# Patient Record
Sex: FEMALE | Race: WHITE | NOT HISPANIC OR LATINO
[De-identification: names, ages, dates, MRNs, and addresses within clinical notes are randomized per-mention and may not be internally consistent; named-entity substitution may affect disease eponyms.]

---

## 2023-08-15 ENCOUNTER — NON-APPOINTMENT (OUTPATIENT)
Age: 58
End: 2023-08-15

## 2023-08-20 PROBLEM — Z00.00 ENCOUNTER FOR PREVENTIVE HEALTH EXAMINATION: Status: ACTIVE | Noted: 2023-08-20

## 2023-08-22 DIAGNOSIS — Z78.9 OTHER SPECIFIED HEALTH STATUS: ICD-10-CM

## 2023-08-22 DIAGNOSIS — N60.12 DIFFUSE CYSTIC MASTOPATHY OF LEFT BREAST: ICD-10-CM

## 2023-08-22 DIAGNOSIS — Z98.890 OTHER SPECIFIED POSTPROCEDURAL STATES: ICD-10-CM

## 2023-08-22 DIAGNOSIS — Z80.3 FAMILY HISTORY OF MALIGNANT NEOPLASM OF BREAST: ICD-10-CM

## 2023-08-22 DIAGNOSIS — N60.11 DIFFUSE CYSTIC MASTOPATHY OF LEFT BREAST: ICD-10-CM

## 2023-08-24 ENCOUNTER — APPOINTMENT (OUTPATIENT)
Dept: BREAST CENTER | Facility: CLINIC | Age: 58
End: 2023-08-24
Payer: COMMERCIAL

## 2023-08-24 VITALS — WEIGHT: 155 LBS | HEIGHT: 64 IN | BODY MASS INDEX: 26.46 KG/M2

## 2023-08-24 DIAGNOSIS — Z12.39 ENCOUNTER FOR OTHER SCREENING FOR MALIGNANT NEOPLASM OF BREAST: ICD-10-CM

## 2023-08-24 DIAGNOSIS — R92.8 OTHER ABNORMAL AND INCONCLUSIVE FINDINGS ON DIAGNOSTIC IMAGING OF BREAST: ICD-10-CM

## 2023-08-24 DIAGNOSIS — Z80.3 FAMILY HISTORY OF MALIGNANT NEOPLASM OF BREAST: ICD-10-CM

## 2023-08-24 DIAGNOSIS — N60.12 DIFFUSE CYSTIC MASTOPATHY OF LEFT BREAST: ICD-10-CM

## 2023-08-24 DIAGNOSIS — N63.20 UNSPECIFIED LUMP IN THE RIGHT BREAST, UNSPECIFIED QUADRANT: ICD-10-CM

## 2023-08-24 DIAGNOSIS — N63.10 UNSPECIFIED LUMP IN THE RIGHT BREAST, UNSPECIFIED QUADRANT: ICD-10-CM

## 2023-08-24 DIAGNOSIS — Z78.9 OTHER SPECIFIED HEALTH STATUS: ICD-10-CM

## 2023-08-24 DIAGNOSIS — Z86.39 PERSONAL HISTORY OF OTHER ENDOCRINE, NUTRITIONAL AND METABOLIC DISEASE: ICD-10-CM

## 2023-08-24 DIAGNOSIS — N60.11 DIFFUSE CYSTIC MASTOPATHY OF LEFT BREAST: ICD-10-CM

## 2023-08-24 PROCEDURE — 99203 OFFICE O/P NEW LOW 30 MIN: CPT

## 2023-08-24 NOTE — PHYSICAL EXAM
[Normocephalic] : normocephalic [Atraumatic] : atraumatic [EOMI] : extra ocular movement intact [Supple] : supple [No Supraclavicular Adenopathy] : no supraclavicular adenopathy [No Cervical Adenopathy] : no cervical adenopathy [Examined in the supine and seated position] : examined in the supine and seated position [No dominant masses] : no dominant masses in right breast  [No dominant masses] : no dominant masses left breast [No Nipple Retraction] : no left nipple retraction [No Nipple Discharge] : no left nipple discharge [Breast Mass Right Breast ___cm] : no masses [Breast Mass Left Breast ___cm] : no masses [No Axillary Lymphadenopathy] : no left axillary lymphadenopathy [No Edema] : no edema [No Rashes] : no rashes [No Ulceration] : no ulceration [Breast Nipple Inversion] : nipples not inverted [Breast Nipple Retraction] : nipples not retracted [Breast Nipple Flattening] : nipples not flattened [Breast Nipple Fissures] : nipples not fissured [Breast Abnormal Lactation (Galactorrhea)] : no galactorrhea [Breast Abnormal Secretion Bloody Fluid] : no bloody discharge [Breast Abnormal Secretion Serous Fluid] : no serous discharge [Breast Abnormal Secretion Opalescent Fluid] : no milky discharge [de-identified] : On exam, the patient has B-cup breasts.  On palpation, I cannot feel any suspicious densities in either breast.  She has no axillary, supraclavicular, or cervical adenopathy.

## 2023-08-24 NOTE — PAST MEDICAL HISTORY
[Postmenopausal] : The patient is postmenopausal [Menopause Age____] : age at menopause was [unfilled] [History of Hormone Replacement Treatment] : has a history of hormone replacement treatment [Total Preg ___] : G[unfilled] [Live Births ___] : P[unfilled]  [Age At Live Birth ___] : Age at live birth: [unfilled]

## 2023-08-24 NOTE — REASON FOR VISIT
[Initial Eval - Existing Diagnosis] : an initial evaluation of an existing diagnosis [FreeTextEntry1] : The patient is a postmenopausal white female of [place] descent with a strong family history of breast cancer.  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  She had some benign calcifications in her right breast and was seen back in 2019 but was lost to follow-up and comes in now to reestablish care.

## 2023-08-24 NOTE — ASSESSMENT
[FreeTextEntry1] : The patient is a 58-year-old G4, P3 postmenopausal white female of [place] descent.  She underwent menopause at age 49 and took hormone replacement therapy for at least 5 years.  She has a strong family history of breast cancer with 2 sisters who had breast cancer at ages 54 and 55 1 who developed metastatic breast cancer.  She has a sister who passed away from sarcoma at age 48 and a paternal cousin had sarcoma in his mid 50s.  The patient underwent a left breast ultrasound guided core biopsy back in August 2012 showing fibroadenomatoid changes.  She was then seen by me in August 2019 with some coarse calcifications in her right breast upper outer quadrant which were followed and unchanged.  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  She had a Dunia 5-year risk of 9.3% and lifetime risk of 52.4% and qualified for screening MRI but was lost to follow-up in 2019.   She underwent her last bilateral mammography and ultrasound which was reviewed from July 28, 2023 performed at [facility] which showed heterogeneous dense changes in bilateral breast masses which are generally unchanged and stable in the left breast 10:00 region and right breast 11:00 region.  Follow-up ultrasound however is being recommended in 6 months around January 2024 and I agree with recommendation and she was given a prescription and I will follow-up on the report.  On exam today, I cannot feel any suspicious findings in either breast.  The patient was reassured and understands the need for routine yearly clinical exams given her increased risk.  I would like her to get screening MRIs which were strongly encouraged however she would not except the gadolinium and thus we cannot move forward with screening MRI surveillance.  The patient understands this and would like to just continue routine yearly mammography and ultrasound.

## 2023-08-24 NOTE — HISTORY OF PRESENT ILLNESS
[FreeTextEntry1] : The patient is a 58-year-old G4, P3 postmenopausal white female of [place] descent.  She underwent menopause at age 49 and took hormone replacement therapy for at least 5 years.  She has a strong family history of breast cancer with 2 sisters who had breast cancer at ages 54 and 55 1 who developed metastatic breast cancer.  She has a sister who passed away from sarcoma at age 48 and a paternal cousin had sarcoma in his mid 50s.  The patient underwent a left breast ultrasound guided core biopsy back in August 2012 showing fibroadenomatoid changes.  She was then seen by me in August 2019 with some coarse calcifications in her right breast upper outer quadrant which were followed and unchanged.  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  She had a Dunia 5-year risk of 9.3% and lifetime risk of 52.4% and qualified for screening MRI but was lost to follow-up in 2019.  She comes in now to reestablish care and routine screening and surveillance given her high risk.